# Patient Record
Sex: FEMALE | Race: WHITE | NOT HISPANIC OR LATINO | Employment: FULL TIME | URBAN - METROPOLITAN AREA
[De-identification: names, ages, dates, MRNs, and addresses within clinical notes are randomized per-mention and may not be internally consistent; named-entity substitution may affect disease eponyms.]

---

## 2019-02-15 ENCOUNTER — OFFICE VISIT (OUTPATIENT)
Dept: GASTROENTEROLOGY | Facility: CLINIC | Age: 54
End: 2019-02-15
Payer: COMMERCIAL

## 2019-02-15 DIAGNOSIS — R19.7 DIARRHEA, UNSPECIFIED TYPE: ICD-10-CM

## 2019-02-15 DIAGNOSIS — R14.0 BLOATING: Primary | ICD-10-CM

## 2019-02-15 PROCEDURE — 91065 BREATH HYDROGEN/METHANE TEST: CPT | Performed by: INTERNAL MEDICINE

## 2019-02-15 NOTE — PROGRESS NOTES
[address] [place] [facility]  Bacterial Overgrowth Analytical Record    Donald Melgar 62 y o  female MRN: [id number]  [facility] [place] [address] [facility] [address] [facility]  [address] [facility] [address] [phone]    Date of Test: 2/15/2019    Substrate Given: Lactulose    Ordering Provider: Dr Lillian Chow Assistant: All    Symptoms: Diarrhea and Bloating    Sample Clock Time ppmH2 ppmCH4 Co2% Jerrod   Baseline   8:30am 19 2 3 4 1 61   #1  20 minutes 8:52am 28 2 3 5 1 57   #2  20 minutes 9:13am 23 1 3 8 1 44   #3  20 minutes 9:32am 24 2 3 5 1 57   #4  20 minutes 9:55am 21 1 3 7 1 48   #5  20 minutes 10:15am 20 2 3 5 1 57   #6  20 minutes 10:35am 21 2 3 4 1 61   #7  20 minutes 10:55am 16 2 3 5 1 57   #8  20 minutes 11:13am 26 1 3 8 1 44   #9  20 minutes 11:35am 30 2 3 3 1 66     Interpretation: Per note on Breath test form " High baseline, will treat with Cipro   See epic "

## 2019-02-25 ENCOUNTER — TELEPHONE (OUTPATIENT)
Dept: GASTROENTEROLOGY | Facility: CLINIC | Age: 54
End: 2019-02-25

## 2019-02-25 DIAGNOSIS — K63.89 SMALL INTESTINAL BACTERIAL OVERGROWTH: Primary | ICD-10-CM

## 2019-02-25 NOTE — TELEPHONE ENCOUNTER
Pt left [facility] asking for  [phone]; is asking for results of breath test; still has diarrhea and other issues/asks for next steps?

## 2019-02-26 RX ORDER — CIPROFLOXACIN 500 MG/1
500 TABLET, FILM COATED ORAL EVERY 12 HOURS SCHEDULED
Qty: 14 TABLET | Refills: 0 | Status: SHIPPED | OUTPATIENT
Start: 2019-02-26 | End: 2019-03-05

## 2019-02-26 NOTE — TELEPHONE ENCOUNTER
Left voicemail with results  High baseline but otherwise normal   Will treat with short course of Cipro    If she remains symptomatic I advised colonoscopy to assess for microscopic colitis

## 2019-03-21 ENCOUNTER — OFFICE VISIT (OUTPATIENT)
Dept: GASTROENTEROLOGY | Facility: CLINIC | Age: 54
End: 2019-03-21
Payer: COMMERCIAL

## 2019-03-21 VITALS
SYSTOLIC BLOOD PRESSURE: 138 MMHG | WEIGHT: 150 LBS | HEART RATE: 84 BPM | HEIGHT: 64 IN | DIASTOLIC BLOOD PRESSURE: 82 MMHG | BODY MASS INDEX: 25.61 KG/M2

## 2019-03-21 DIAGNOSIS — R19.7 DIARRHEA, UNSPECIFIED TYPE: Primary | ICD-10-CM

## 2019-03-21 DIAGNOSIS — Z12.11 COLON CANCER SCREENING: ICD-10-CM

## 2019-03-21 DIAGNOSIS — K63.89 SMALL INTESTINAL BACTERIAL OVERGROWTH: ICD-10-CM

## 2019-03-21 PROCEDURE — 99213 OFFICE O/P EST LOW 20 MIN: CPT | Performed by: INTERNAL MEDICINE

## 2019-03-21 RX ORDER — FENOFIBRIC ACID 135 MG/1
CAPSULE, DELAYED RELEASE ORAL
COMMUNITY
Start: 2019-01-22

## 2019-03-21 RX ORDER — ATORVASTATIN CALCIUM 20 MG/1
20 TABLET, FILM COATED ORAL
COMMUNITY
Start: 2018-06-19

## 2019-03-21 RX ORDER — LORATADINE 10 MG/1
10 TABLET ORAL
COMMUNITY

## 2019-03-21 RX ORDER — BUPROPION HYDROCHLORIDE 75 MG/1
75 TABLET ORAL
COMMUNITY
Start: 2018-06-13

## 2019-03-21 RX ORDER — AMOXICILLIN AND CLAVULANATE POTASSIUM 875; 125 MG/1; MG/1
1 TABLET, FILM COATED ORAL 2 TIMES DAILY
Qty: 14 TABLET | Refills: 0 | Status: SHIPPED | OUTPATIENT
Start: 2019-03-21 | End: 2019-03-28

## 2019-03-21 RX ORDER — BLOOD-GLUCOSE SENSOR
EACH MISCELLANEOUS
COMMUNITY
Start: 2019-02-21

## 2019-03-21 NOTE — PROGRESS NOTES
[address] [place] [facility] - Outpatient Follow-up Note  Alicia Burns 62 y o  female MRN: [id number]  Encounter: [id number]    ASSESSMENT AND PLAN:      1  Diarrhea, unspecified type  Started December 5th  Stool studies ordered by her prior gastroenterologist unrevealing  She continues to experience bloating and diarrhea despite reducing gluten and fatty foods  Celiac panel, sed rate and TSH normal   Breath test for bacterial overgrowth showed elevated baseline, treated with Cipro with minimal improvement  Discussed options and we will treat SIBO with an alternate antibiotic (Augmentin) and check additional stool studies  We discussed colonoscopy for microscopic colitis  We discussed nutrition evaluation  2  Colon cancer screening  Negative colonoscopy with another practice in 2013      Followup Appointment[de-identified]  Pending stool studies  ______________________________________________________________________    Chief complaint:  Diarrhea    HPI:  The patient returns for follow-up on diarrhea  She was initially seen January 21st with complaint since December 5th  Stool studies including C diff were negative  She started gluten free diet on her own with some improvement  She typically felt well upon awakening, then developed abdominal distention and bloating in the afternoon with nocturnal diarrhea  Breath test showed a high baseline  She was treated with Cipro with little improvement  She is frustrated by persistent symptoms  She denies any rectal bleeding  Symptoms occur every day regardless of food type      Historical Information   Past Medical History:   Diagnosis Date    Diabetes mellitus ([address] )     Fatty liver     Hyperlipidemia      Past Surgical History:   Procedure Laterality Date    COL[facility]COPY  2013    [facility], negative    FUNDOPLICATION TRANSORAL  7498    [facility] procedure    HYSTERECTOMY  1996    UPPER GASTROINTESTINAL ENDOSCOPY  2017    [facility], with dilatation     Social History     Substance and Sexual Activity   Alcohol Use Not on file     Social History     Substance and Sexual Activity   Drug Use Not on file     Social History     Tobacco Use   Smoking Status Not on file     No family history on file  No current outpatient medications on file  Allergies not on file    10 Point REVIEW OF SYSTEMS IS OTHERWISE NEGATIVE  PHYSICAL EXAM:    There were no vitals taken for this visit  There is no height or weight on file to calculate BMI  General Appearance:  Alert, cooperative, no distress  HEENT:  Normocephalic, atraumatic, anicteric  Neck: Supple, symmetrical, trachea midline  Lungs: Clear to auscultation bilaterally; no rales, rhonchi or wheezing; respirations unlabored   Heart: Regular rate and rhythm; no murmur, rub, or gallop  Abdomen:   Soft, non-tender, non-distended; normal bowel sounds; no masses, no organomegaly   Rectal:  Deferred   Extremities:  No cyanosis, clubbing or edema   Skin:  No jaundice, rashes, or lesions   Lymph nodes: No palpable cervical lymphadenopathy     Lab Results:   No results found for: WBC, HGB, HCT, MCV, PLT  No results found for: NA, K, CL, CO2, ANIONGAP, BUN, CREATININE, GLUCOSE, GLUF, CALCIUM, CORRECTEDCA, AST, ALT, ALKPHOS, PROT, BILITOT, EGFR  No results found for: IRON, TIBC, FERRITIN  No results found for: LIPASE    Radiology Results:   No results found

## 2019-03-21 NOTE — LETTER
March 21, 2019     Annmarie Green MD  [address] [facility]  [address]  [facility] [address]    Patient: Leda Christianson   YOB: 1965   Date of Visit: 3/21/2019       Dear Dr Bruce Yap: Thank you for referring Leda Christianson to me for evaluation  Below are my notes for this consultation  If you have questions, please do not hesitate to call me  I look forward to following your patient along with you  Sincerely,        Millie Redman DO        CC: No Recipients  Millie Redman DO  3/21/2019  4:21 PM  Sign at close encounter  [facility] - Outpatient Follow-up Note  Leda Christianson 62 y o  female MRN: [id number]  Encounter: [id number]    ASSESSMENT AND PLAN:      1  Diarrhea, unspecified type  Started December 5th  Stool studies ordered by her prior gastroenterologist unrevealing  She continues to experience bloating and diarrhea despite reducing gluten and fatty foods  Celiac panel, sed rate and TSH normal   Breath test for bacterial overgrowth showed elevated baseline, treated with Cipro with minimal improvement  Discussed options and we will treat SIBO with an alternate antibiotic (Augmentin) and check additional stool studies  We discussed colonoscopy for microscopic colitis  We discussed nutrition evaluation  2  Colon cancer screening  Negative colonoscopy with another practice in 2013      Followup Appointment[de-identified]  Pending stool studies  ______________________________________________________________________    Chief complaint:  Diarrhea    HPI:  The patient returns for follow-up on diarrhea  She was initially seen January 21st with complaint since December 5th  Stool studies including C diff were negative  She started gluten free diet on her own with some improvement  She typically felt well upon awakening, then developed abdominal distention and bloating in the afternoon with nocturnal diarrhea  Breath test showed a high baseline    She was treated with Cipro with little improvement  She is frustrated by persistent symptoms  She denies any rectal bleeding  Symptoms occur every day regardless of food type  Historical Information   Past Medical History:   Diagnosis Date    Diabetes mellitus ([address] )     Fatty liver     Hyperlipidemia      Past Surgical History:   Procedure Laterality Date    COLON[facility]COPY  2013    [facility], negative    FUNDOPLICATION TRANSORAL  0472    [facility] procedure    HYSTERECTOMY  1996    UPPER GASTROINTESTINAL ENDOSCOPY  2017    [facility], with dilatation     Social History     Substance and Sexual Activity   Alcohol Use Not on file     Social History     Substance and Sexual Activity   Drug Use Not on file     Social History     Tobacco Use   Smoking Status Not on file     No family history on file  No current outpatient medications on file  Allergies not on file    10 Point REVIEW OF SYSTEMS IS OTHERWISE NEGATIVE  PHYSICAL EXAM:    There were no vitals taken for this visit  There is no height or weight on file to calculate BMI  General Appearance:  Alert, cooperative, no distress  HEENT:  Normocephalic, atraumatic, anicteric  Neck: Supple, symmetrical, trachea midline  Lungs: Clear to auscultation bilaterally; no rales, rhonchi or wheezing; respirations unlabored   Heart: Regular rate and rhythm; no murmur, rub, or gallop  Abdomen:   Soft, non-tender, non-distended; normal bowel sounds; no masses, no organomegaly   Rectal:  Deferred   Extremities:  No cyanosis, clubbing or edema   Skin:  No jaundice, rashes, or lesions   Lymph nodes: No palpable cervical lymphadenopathy     Lab Results:   No results found for: WBC, HGB, HCT, MCV, PLT  No results found for: NA, K, CL, CO2, ANIONGAP, BUN, CREATININE, GLUCOSE, GLUF, CALCIUM, CORRECTEDCA, AST, ALT, ALKPHOS, PROT, BILITOT, EGFR  No results found for: IRON, TIBC, FERRITIN  No results found for: LIPASE    Radiology Results:   No results found

## 2019-04-09 ENCOUNTER — TELEPHONE (OUTPATIENT)
Dept: GASTROENTEROLOGY | Facility: CLINIC | Age: 54
End: 2019-04-09

## 2019-04-09 DIAGNOSIS — K63.89 SMALL INTESTINAL BACTERIAL OVERGROWTH: Primary | ICD-10-CM

## 2019-04-09 RX ORDER — METRONIDAZOLE 500 MG/1
500 TABLET ORAL 3 TIMES DAILY
Qty: 30 TABLET | Refills: 0 | Status: SHIPPED | OUTPATIENT
Start: 2019-04-09 | End: 2019-04-19

## 2019-05-07 ENCOUNTER — PATIENT MESSAGE (OUTPATIENT)
Dept: GASTROENTEROLOGY | Facility: CLINIC | Age: 54
End: 2019-05-07

## 2019-05-07 DIAGNOSIS — K63.89 SMALL INTESTINAL BACTERIAL OVERGROWTH: Primary | ICD-10-CM

## 2019-05-08 RX ORDER — METRONIDAZOLE 500 MG/1
500 TABLET ORAL 3 TIMES DAILY
Qty: 42 TABLET | Refills: 0 | Status: SHIPPED | OUTPATIENT
Start: 2019-05-08 | End: 2019-05-22

## 2019-06-21 DIAGNOSIS — R14.0 BLOATING: Primary | ICD-10-CM

## 2019-10-21 ENCOUNTER — OFFICE VISIT (OUTPATIENT)
Dept: GASTROENTEROLOGY | Facility: CLINIC | Age: 54
End: 2019-10-21
Payer: COMMERCIAL

## 2019-10-21 VITALS
WEIGHT: 147 LBS | DIASTOLIC BLOOD PRESSURE: 90 MMHG | SYSTOLIC BLOOD PRESSURE: 130 MMHG | HEART RATE: 71 BPM | BODY MASS INDEX: 25.1 KG/M2 | HEIGHT: 64 IN

## 2019-10-21 DIAGNOSIS — R19.7 DIARRHEA, UNSPECIFIED TYPE: ICD-10-CM

## 2019-10-21 DIAGNOSIS — Z12.11 SCREENING FOR COLON CANCER: ICD-10-CM

## 2019-10-21 DIAGNOSIS — R14.0 BLOATING: Primary | ICD-10-CM

## 2019-10-21 PROCEDURE — 99213 OFFICE O/P EST LOW 20 MIN: CPT | Performed by: INTERNAL MEDICINE

## 2019-10-21 NOTE — PROGRESS NOTES
[facility] - Outpatient Follow-up Note  Edgard Andujar 62 y o  female MRN: [id number]  Encounter: [id number]    ASSESSMENT AND PLAN:      1  Bloating  Started after [place]  Abdominal bloating and distention persists despite treatment of small intestinal bacterial overgrowth  Likely gas bloat after fundoplication, but a small bowel issue like diverticulosis or stricture could lead to recurrent bacterial overgrowth symptoms  Will proceed with upper GI/small bowel series  If negative will consider gastric emptying scan for gastroparesis  She has a call to her surgeon for further advice as well  - [place] upper GI / small bowel with air; Future    2  Diarrhea, unspecified type  Resolved with treatment of bacterial overgrowth    3  Screening for colon cancer  Negative colonoscopy with another practice 2013      Followup Appointment:  Pending imaging______________________________________________________________________    Chief Complaint   Patient presents with   Norakwasi Vazquez     HPI:  The patient returns for follow-up on bloating and abdominal distention  She was last seen in the office in March  Since then she had multiple rounds of antibiotics for bacterial overgrowth, each only provided a few days of relief  She had no change in symptoms with the addition of a probiotic  Last month she had antibiotics for rosacea that also improved her GI complaints  She awakens in the morning with minimal complaints but develops abdominal fullness even with water  Appetite is good  She denies nausea or vomiting  She has belching throughout the day with minimal release of symptoms  She previously had diarrhea but this has resolved  She now has 1 formed, non urgent stool daily  Her Victoza dose was decreased with no change in symptoms  She notices increased bloating with certain types of fruits and vegetables but cannot define and exact trigger      Historical Information Past Medical History:   Diagnosis Date    Depression     Diabetes mellitus ([address] )     Fatty liver     Hyperlipidemia      Past Surgical History:   Procedure Laterality Date    APPENDECTOMY      [facility], [facility]    FUNDOPLICATION TRANSORAL  47/20/6063    [facility] procedure    HYSTERECTOMY  1996    UPPER GASTROINTESTINAL ENDOSCOPY  2017    [facility], with dilatation     Social History     Substance and Sexual Activity   Alcohol Use Yes    Frequency: 2-4 times a month    Drinks per session: 1 or 2     Social History     Substance and Sexual Activity   Drug Use Not on file     Social History     Tobacco Use   Smoking Status Never Smoker   Smokeless Tobacco Never Used     Family History   Problem Relation Age of Onset    Diabetes Father     Cancer Father     Pancreatic cancer Father          Current Outpatient Medications:     atorvastatin (LIPITOR) 20 mg tablet    buPROPion (WELLBUTRIN) 75 mg tablet    Choline Fenofibrate (FENOFIBRIC ACID) 135 MG CPDR    Continuous Blood Gluc Sensor (DEXCOM G6 SENSOR) MISC    HUMALOG 100 UNIT/ML injection    liraglutide (VICTOZA) injection    loratadine (CLARITIN) 10 mg tablet  Allergies   Allergen Reactions    Amoxicillin Hives    Doxycycline Hives    Ibuprofen Hives    Morphine Hives     BP drops    Sodium Metabisulfite Hives    Famotidine Hives and Rash     Reviewed medications and allergies and updated as indicated    PHYSICAL EXAM:    Blood pressure 130/90, pulse 71, height 5' 4" (1 626 m), weight 66 7 kg (147 lb)  Body mass index is 25 23 kg/m²  General Appearance: NAD, cooperative, alert  Eyes: Anicteric, PERRLA, EOMI  ENT:  Normocephalic, atraumatic, normal mucosa  Neck:  Supple, symmetrical, trachea midline  Resp:  Clear to auscultation bilaterally; no rales, rhonchi or wheezing; respirations unlabored   CV:  S1 S2, Regular rate and rhythm; no murmur, rub, or gallop    GI:  Soft, non-tender, minimal distention, mild tympany; normal bowel sounds; no masses, no organomegaly   Rectal: Deferred  Musculoskeletal: No cyanosis, clubbing or edema  Normal ROM  Skin:  No jaundice, rashes, or lesions   Heme/Lymph: No palpable cervical lymphadenopathy  Psych: Normal affect, good eye contact  Neuro: No gross deficits, AAOx3    Lab Results:   No results found for: WBC, HGB, HCT, MCV, PLT  No results found for: NA, K, CL, CO2, ANIONGAP, BUN, CREATININE, GLUCOSE, GLUF, CALCIUM, CORRECTEDCA, AST, ALT, ALKPHOS, PROT, BILITOT, EGFR  No results found for: IRON, TIBC, FERRITIN  No results found for: LIPASE    Radiology Results:   No results found

## 2019-11-05 ENCOUNTER — TELEPHONE (OUTPATIENT)
Dept: GASTROENTEROLOGY | Facility: CLINIC | Age: 54
End: 2019-11-05

## 2019-11-05 DIAGNOSIS — K31.84 GASTROPARESIS: Primary | ICD-10-CM

## 2019-11-05 RX ORDER — METOCLOPRAMIDE 5 MG/1
5 TABLET ORAL 2 TIMES DAILY
Qty: 60 TABLET | Refills: 2 | Status: SHIPPED | OUTPATIENT
Start: 2019-11-05 | End: 2020-01-03 | Stop reason: SDUPTHER

## 2019-11-05 NOTE — TELEPHONE ENCOUNTER
Called patient with results of upper GI  Showed prolonged gastric retention  Suspect this is a consequence of diabetes and difficulty managing her blood sugars over the past several months  Discussed gastroparesis diet  Discussed other options including Reglan and EGD with Botox injection of the pylorus    Will start with a low-dose Reglan twice daily  Side effects discussed    I asked her to contact me with updates via portal   Please arrange office visit in December

## 2020-01-03 ENCOUNTER — OFFICE VISIT (OUTPATIENT)
Dept: GASTROENTEROLOGY | Facility: CLINIC | Age: 55
End: 2020-01-03
Payer: COMMERCIAL

## 2020-01-03 VITALS
BODY MASS INDEX: 25.44 KG/M2 | SYSTOLIC BLOOD PRESSURE: 118 MMHG | WEIGHT: 149 LBS | DIASTOLIC BLOOD PRESSURE: 80 MMHG | HEIGHT: 64 IN | HEART RATE: 78 BPM

## 2020-01-03 DIAGNOSIS — R19.7 DIARRHEA, UNSPECIFIED TYPE: ICD-10-CM

## 2020-01-03 DIAGNOSIS — K31.84 GASTROPARESIS: Primary | ICD-10-CM

## 2020-01-03 DIAGNOSIS — Z12.11 SCREENING FOR COLON CANCER: ICD-10-CM

## 2020-01-03 DIAGNOSIS — K92.89 GAS BLOAT SYNDROME: ICD-10-CM

## 2020-01-03 PROCEDURE — 99214 OFFICE O/P EST MOD 30 MIN: CPT | Performed by: INTERNAL MEDICINE

## 2020-01-03 RX ORDER — METOCLOPRAMIDE 5 MG/1
10 TABLET ORAL 2 TIMES DAILY
Qty: 120 TABLET | Refills: 2 | Status: SHIPPED | OUTPATIENT
Start: 2020-01-03 | End: 2020-03-05 | Stop reason: SINTOL

## 2020-01-03 NOTE — LETTER
January 3, 2020     Sharmila Villalpando MD  [address] [facility] [address]  [facility] [address]    Patient: Williams Walsh   YOB: 1965   Date of Visit: 1/3/2020       Dear Dr Aishwarya Servin: Thank you for referring Williams Walsh to me for evaluation  Below are my notes for this consultation  If you have questions, please do not hesitate to call me  I look forward to following your patient along with you  Sincerely,        Hattie Campos DO        CC: MD Hattie Bobby DO  1/3/2020  5:11 PM  Incomplete  [facility] - Outpatient Follow-up Note  Williams Walsh 62 y o  female MRN: [id number]  Encounter: [id number]    ASSESSMENT AND PLAN:      1  Gastroparesis  2  Gas bloat syndrome  Identified on upper GI in October  Treatment of gastroparesis with metoclopramide and dietary changes is helping her gas bloat symptoms  Will increase metoclopramide as tolerated, initially will try 10 mg twice daily but this could be spread out to 5 mg 4 times daily if needed  We discussed Botox injection of the pylorus if she is intolerant of Reglan  Side effects were discussed  - metoclopramide (REGLAN) 5 mg tablet; Take 2 tablets (10 mg total) by mouth 2 (two) times a day  Dispense: 120 tablet; Refill: 2    3  Diarrhea, unspecified type  Resolved with treatment of bacterial overgrowth  Now feels constipated since starting a lower fiber diet for gastroparesis  Recommended Benefiber    4  Screening for colon cancer  Negative colonoscopy with another practice in 2013    Followup Appointment:  2 months  ______________________________________________________________________    Chief Complaint   Patient presents with    Follow up to upper gi and starting new meds     HPI:  The patient returns for follow-up on bloating and belching  Since her last office visit she had an upper GI that showed gastroparesis  We started Reglan twice daily and initially she had diarrhea    She began to adhere to a gastroparesis diet and started eating less salads  Since then she has had no further diarrhea and actually feels constipated  She takes Senokot if there is no bowel movement 2-3 days  Her gas and bloating have improved but not resolved  She denies vomiting  She has very little appetite during the day and blood sugars have been low  She then gets very hungry for dinner and sugar spike  She often has to sit up at night to allow belching        Historical Information   Past Medical History:   Diagnosis Date    Depression     Diabetes mellitus ([address] )     Fatty liver     Hyperlipidemia      Past Surgical History:   Procedure Laterality Date    APPENDECTOMY      COLON[facility]COPY  2013    [facility], negative    FUNDOPLICATION TRANSORAL  42/77/1654    [facility] procedure    HYSTERECTOMY  1996    UPPER GASTROINTESTINAL ENDOSCOPY  2017    [facility], with dilatation     Social History     Substance and Sexual Activity   Alcohol Use Yes    Frequency: 2-4 times a month    Drinks per session: 1 or 2     Social History     Substance and Sexual Activity   Drug Use Not on file     Social History     Tobacco Use   Smoking Status Never Smoker   Smokeless Tobacco Never Used     Family History   Problem Relation Age of Onset    Diabetes Father     Cancer Father     Pancreatic cancer Father          Current Outpatient Medications:     atorvastatin (LIPITOR) 20 mg tablet    buPROPion (WELLBUTRIN) 75 mg tablet    Choline Fenofibrate (FENOFIBRIC ACID) 135 MG CPDR    Continuous Blood Gluc Sensor (DEXCOM G6 SENSOR) MISC    HUMALOG 100 UNIT/ML injection    liraglutide (VICTOZA) injection    loratadine (CLARITIN) 10 mg tablet    metoclopramide (REGLAN) 5 mg tablet  Allergies   Allergen Reactions    Amoxicillin Hives    Doxycycline Hives    Ibuprofen Hives    Morphine Hives     BP drops    Sodium Metabisulfite Hives    Famotidine Hives and Rash     Reviewed medications and allergies and updated as indicated    PHYSICAL EXAM:    Blood pressure 118/80, pulse 78, height 5' 4" (1 626 m), weight 67 6 kg (149 lb)  Body mass index is 25 58 kg/m²  General Appearance: NAD, cooperative, alert  Eyes: Anicteric, PERRLA, EOMI  ENT:  Normocephalic, atraumatic, normal mucosa  Neck:  Supple, symmetrical, trachea midline  Resp:  Clear to auscultation bilaterally; no rales, rhonchi or wheezing; respirations unlabored   CV:  S1 S2, Regular rate and rhythm; no murmur, rub, or gallop  GI:  Soft, non-tender, mild distention; normal bowel sounds; no masses, no organomegaly   Rectal: Deferred  Musculoskeletal: No cyanosis, clubbing or edema  Normal ROM  Skin:  No jaundice, rashes, or lesions   Heme/Lymph: No palpable cervical lymphadenopathy  Psych: Normal affect, good eye contact  Neuro: No gross deficits, AAOx3    Lab Results:   No results found for: WBC, HGB, HCT, MCV, PLT  No results found for: NA, K, CL, CO2, ANIONGAP, BUN, CREATININE, GLUCOSE, GLUF, CALCIUM, CORRECTEDCA, AST, ALT, ALKPHOS, PROT, BILITOT, EGFR  No results found for: IRON, TIBC, FERRITIN  No results found for: LIPASE    Radiology Results:   No results found

## 2020-01-03 NOTE — PROGRESS NOTES
[address] [facility] - Outpatient Follow-up Note  Patrizia Harris 62 y o  female MRN: [id number]  Encounter: [id number]    ASSESSMENT AND PLAN:      1  Gastroparesis  2  Gas bloat syndrome  Identified on upper GI in October  Treatment of gastroparesis with metoclopramide and dietary changes is helping her gas bloat symptoms  Will increase metoclopramide as tolerated, initially will try 10 mg twice daily but this could be spread out to 5 mg 4 times daily if needed  We discussed Botox injection of the pylorus if she is intolerant of Reglan  Side effects were discussed  - metoclopramide (REGLAN) 5 mg tablet; Take 2 tablets (10 mg total) by mouth 2 (two) times a day  Dispense: 120 tablet; Refill: 2    3  Diarrhea, unspecified type  Resolved with treatment of bacterial overgrowth  Now feels constipated since starting a lower fiber diet for gastroparesis  Recommended Benefiber    4  Screening for colon cancer  Negative colonoscopy with another practice in 2013    Followup Appointment:  2 months  ______________________________________________________________________    Chief Complaint   Patient presents with    Follow up to upper gi and starting new meds     HPI:  The patient returns for follow-up on bloating and belching  Since her last office visit she had an upper GI that showed gastroparesis  We started Reglan twice daily and initially she had diarrhea  She began to adhere to a gastroparesis diet and started eating less salads  Since then she has had no further diarrhea and actually feels constipated  She takes Senokot if there is no bowel movement 2-3 days  Her gas and bloating have improved but not resolved  She denies vomiting  She has very little appetite during the day and blood sugars have been low  She then gets very hungry for dinner and sugar spike  She often has to sit up at night to allow belching        Historical Information   Past Medical History:   Diagnosis Date  Depression     Diabetes mellitus [address] [address]     Fatty liver     Hyperlipidemia      Past Surgical History:   Procedure Laterality Date    APPENDECTOMY      COL[facility]OSCOPY  2013    [facility], negative    FUNDOPLICATION TRANSORAL  31/81/7419    [facility] procedure    HYSTERECTOMY  1996    UPPER GASTROINTESTINAL ENDOSCOPY  2017    [facility], with dilatation     Social History     Substance and Sexual Activity   Alcohol Use Yes    Frequency: 2-4 times a month    Drinks per session: 1 or 2     Social History     Substance and Sexual Activity   Drug Use Not on file     Social History     Tobacco Use   Smoking Status Never Smoker   Smokeless Tobacco Never Used     Family History   Problem Relation Age of Onset    Diabetes Father     Cancer Father     Pancreatic cancer Father          Current Outpatient Medications:     atorvastatin (LIPITOR) 20 mg tablet    buPROPion (WELLBUTRIN) 75 mg tablet    Choline Fenofibrate (FENOFIBRIC ACID) 135 MG CPDR    Continuous Blood Gluc Sensor (DEXCOM G6 SENSOR) MISC    HUMALOG 100 UNIT/ML injection    liraglutide (VICTOZA) injection    loratadine (CLARITIN) 10 mg tablet    metoclopramide (REGLAN) 5 mg tablet  Allergies   Allergen Reactions    Amoxicillin Hives    Doxycycline Hives    Ibuprofen Hives    Morphine Hives     BP drops    Sodium Metabisulfite Hives    Famotidine Hives and Rash     Reviewed medications and allergies and updated as indicated    PHYSICAL EXAM:    Blood pressure 118/80, pulse 78, height 5' 4" (1 626 m), weight 67 6 kg (149 lb)  Body mass index is 25 58 kg/m²  General Appearance: NAD, cooperative, alert  Eyes: Anicteric, PERRLA, EOMI  ENT:  Normocephalic, atraumatic, normal mucosa  Neck:  Supple, symmetrical, trachea midline  Resp:  Clear to auscultation bilaterally; no rales, rhonchi or wheezing; respirations unlabored   CV:  S1 S2, Regular rate and rhythm; no murmur, rub, or gallop    GI:  Soft, non-tender, mild distention; normal bowel sounds; no masses, no organomegaly   Rectal: Deferred  Musculoskeletal: No cyanosis, clubbing or edema  Normal ROM  Skin:  No jaundice, rashes, or lesions   Heme/Lymph: No palpable cervical lymphadenopathy  Psych: Normal affect, good eye contact  Neuro: No gross deficits, AAOx3    Lab Results:   No results found for: WBC, HGB, HCT, MCV, PLT  No results found for: NA, K, CL, CO2, ANIONGAP, BUN, CREATININE, GLUCOSE, GLUF, CALCIUM, CORRECTEDCA, AST, ALT, ALKPHOS, PROT, BILITOT, EGFR  No results found for: IRON, TIBC, FERRITIN  No results found for: LIPASE    Radiology Results:   No results found  emergency department

## 2020-01-03 NOTE — PATIENT INSTRUCTIONS
Increase Reglan to 10 mg twice daily    We have the option to change to 5 mg 4 times a day if needed based on efficacy   add Benefiber once daily in the morning

## 2020-01-08 ENCOUNTER — TELEPHONE (OUTPATIENT)
Dept: GASTROENTEROLOGY | Facility: CLINIC | Age: 55
End: 2020-01-08

## 2020-01-08 NOTE — TELEPHONE ENCOUNTER
Called pt back with Dr Valentin Null recommendations  She is feeling better and has been able to keep soda down  She will call back or proceed to the ER if needed

## 2020-01-08 NOTE — TELEPHONE ENCOUNTER
Pt states she doesn't feel well/asks to [facility]noel w/ Dr Mirta Hendricks  She can't keep anything down/even liquid; has been vomiting all morning/might have gastroparesis/questions blockage; no pain  Advised Dr Ed Easley not here/will have a nurse CB   CB# [phone]

## 2020-01-08 NOTE — TELEPHONE ENCOUNTER
Called pt back, still unable "to keep anything down "  She vomited last at 11:00 and will try again now  Started today with vomiting and she believes is related to her gastroparesis and constipation  Saw Dr Kathryn Baltazar 1/3 and doubled her Reglan dose to 5 mg 2 BID which she started 1/3  Felt well until this am   Denies pain, dizziness or chest pain  Does have bloating and cramping just as she always does  Has been a few days since her last bowel movement  Takes Senokot as needed  Was suggested to try benefiber but wanted to wait to see how the Reglan would work first   Requests advice  Could you please address as Dr Kathryn Baltazar is not available today

## 2020-01-08 NOTE — TELEPHONE ENCOUNTER
And advise small frequent meals, would stick with a liquid diet for the next few days until she is feeling better  Hold off on benefit fiber for now until she is feeling better and sees how the Reglan is working  If feeling constipated can try MiraLax 17 g with 8 oz of fluid daily, or if needed could use rectal suppository or enema  If really unable to keep anything down and feeling dehydrated or lightheaded should go to emergency room

## 2020-01-13 LAB — HBA1C MFR BLD HPLC: 6.7 %

## 2020-03-05 ENCOUNTER — OFFICE VISIT (OUTPATIENT)
Dept: GASTROENTEROLOGY | Facility: CLINIC | Age: 55
End: 2020-03-05
Payer: COMMERCIAL

## 2020-03-05 VITALS
SYSTOLIC BLOOD PRESSURE: 120 MMHG | HEART RATE: 80 BPM | BODY MASS INDEX: 24.41 KG/M2 | WEIGHT: 143 LBS | DIASTOLIC BLOOD PRESSURE: 82 MMHG | HEIGHT: 64 IN

## 2020-03-05 DIAGNOSIS — R11.0 NAUSEA: Primary | ICD-10-CM

## 2020-03-05 DIAGNOSIS — K92.89 GAS BLOAT SYNDROME: ICD-10-CM

## 2020-03-05 DIAGNOSIS — K31.84 GASTROPARESIS: ICD-10-CM

## 2020-03-05 DIAGNOSIS — R19.4 CHANGE IN BOWEL HABITS: ICD-10-CM

## 2020-03-05 DIAGNOSIS — Z12.11 SCREENING FOR COLON CANCER: ICD-10-CM

## 2020-03-05 PROCEDURE — 99214 OFFICE O/P EST MOD 30 MIN: CPT | Performed by: INTERNAL MEDICINE

## 2020-03-05 RX ORDER — ONDANSETRON 4 MG/1
4 TABLET, ORALLY DISINTEGRATING ORAL EVERY 6 HOURS PRN
Qty: 20 TABLET | Refills: 0 | Status: SHIPPED | OUTPATIENT
Start: 2020-03-05

## 2020-03-05 NOTE — PROGRESS NOTES
[email or web address] - Outpatient Follow-up Note  Sandra Sofia 60 y o  female MRN: [id number]  Encounter: [id number]    ASSESSMENT AND PLAN:      1  Nausea  2  Gastroparesis  Gastroparesis identified on upper GI October 2019  Did not tolerate Reglan due to side effects of fatigue and depression  Some improvement with gastroparesis diet but she has nausea/vomiting if she over eats  Discussed other options like domperidone or EGD with Botox injection of the pylorus but she is not ready for this yet  Okay to use Zofran as needed   - ondansetron (ZOFRAN-ODT) 4 mg disintegrating tablet; Take 1 tablet (4 mg total) by mouth every 6 (six) hours as needed for nausea or vomiting  Dispense: 20 tablet; Refill: 0    3  Gas bloat syndrome  Multifactorial, gastroparesis and lynx fundoplication  Improving with gastroparesis diet, particularly limiting dietary fiber    4  Change in bowel habits  Prior baseline was diarrhea, now constipated  Suspect this is related to reducing dietary fiber  Recommended increasing her daily Benefiber    5  Screening for colon cancer  Negative colonoscopy with another practice in 2013      Followup Appointment:  3-4 months  ______________________________________________________________________    Chief Complaint   Patient presents with    Follow up-gastroparesis     HPI:  The patient returns for follow-up on gastroparesis and bloating  She was last seen in the office in January  At that time she had some improvement of gastroparesis with medical provided and a gastroparesis diet  She gradually developed more Reglan side effects like fatigue and depression so she stopped it  She has focused morning gastroparesis diet  Her bloating has improved significantly, particularly with reducing fiber  She still experiences intermittent nausea and has vomiting if she eats too much  Lately she has experience constipation  Prior baseline was diarrhea    She denies any rectal bleeding  Her diabetic meds were recently adjusted  Historical Information   Past Medical History:   Diagnosis Date    Depression     Diabetes mellitus ([address] )     Fatty liver     Hyperlipidemia      Past Surgical History:   Procedure Laterality Date    APPENDECTOMY      COLONOSCOPY  2013    [facility], negative    FUNDOPLICATION TRANSORAL  41/[id number]/8230    Lynx procedure    HYSTERECTOMY  1996    UPPER GASTROINTESTINAL ENDOSCOPY  2017    [facility], with dilatation     Social History     Substance and Sexual Activity   Alcohol Use Yes    Frequency: 2-4 times a month    Drinks per session: 1 or 2     Social History     Substance and Sexual Activity   Drug Use Not on file     Social History     Tobacco Use   Smoking Status Never Smoker   Smokeless Tobacco Never Used     Family History   Problem Relation Age of Onset    Diabetes Father     Cancer Father     Pancreatic cancer Father     Colon cancer Neg Hx     Colon polyps Neg Hx          Current Outpatient Medications:     atorvastatin (LIPITOR) 20 mg tablet    buPROPion (WELLBUTRIN) 75 mg tablet    Choline Fenofibrate (FENOFIBRIC ACID) 135 MG CPDR    Continuous Blood Gluc Sensor (DEXCOM G6 SENSOR) MISC    HUMALOG 100 UNIT/ML injection    liraglutide (VICTOZA) injection    loratadine (CLARITIN) 10 mg tablet    ondansetron (ZOFRAN-ODT) 4 mg disintegrating tablet  Allergies   Allergen Reactions    Amoxicillin Hives    Doxycycline Hives    Ibuprofen Hives    Morphine Hives     BP drops    Sodium Metabisulfite Hives    Famotidine Hives and Rash     Reviewed medications and allergies and updated as indicated    PHYSICAL EXAM:    Blood pressure 120/82, pulse 80, height 5' 4" (1 626 m), weight 64 9 kg (143 lb)  Body mass index is 24 55 kg/m²  General Appearance: NAD, cooperative, alert  Eyes: Anicteric, PERRLA, EOMI  ENT:  Normocephalic, atraumatic, normal mucosa      Neck:  Supple, symmetrical, trachea midline  Resp: Clear to auscultation bilaterally; no rales, rhonchi or wheezing; respirations unlabored   CV:  S1 S2, Regular rate and rhythm; no murmur, rub, or gallop  GI:  Soft, non-tender, non-distended; normal bowel sounds; no masses, no organomegaly   Rectal: Deferred  Musculoskeletal: No cyanosis, clubbing or edema  Normal ROM  Skin:  No jaundice, rashes, or lesions   Heme/Lymph: No palpable cervical lymphadenopathy  Psych: Normal affect, good eye contact  Neuro: No gross deficits, AAOx3    Lab Results:   No results found for: WBC, HGB, HCT, MCV, PLT  No results found for: NA, K, CL, CO2, ANIONGAP, BUN, CREATININE, GLUCOSE, GLUF, CALCIUM, CORRECTEDCA, AST, ALT, ALKPHOS, PROT, BILITOT, EGFR  No results found for: IRON, TIBC, FERRITIN  No results found for: LIPASE    Radiology Results:   No results found

## 2020-08-19 ENCOUNTER — TELEPHONE (OUTPATIENT)
Dept: GASTROENTEROLOGY | Facility: CLINIC | Age: 55
End: 2020-08-19

## 2020-08-19 NOTE — TELEPHONE ENCOUNTER
"Opaque density" is lynx device for reflux  Gastric distention is from gastroparesis and gas bloat, exacerbated by anti-reflux procedure; similar findings seen on prior upper GI    If she has ongoing GI symptoms we can arrange office or virtual visit, otherwise she can contact us as needed

## 2020-08-19 NOTE — TELEPHONE ENCOUNTER
Dr Maegan Barry - please see x-ray from [address] from  8/19/2020   (care everywhere)  Shows gastric distention and opaque density at esophagogastric junction

## 2020-08-19 NOTE — TELEPHONE ENCOUNTER
Patient called  States had x-ray yesterday ordered by urology and they told her to call GI about something on the results  She is going to have them fax x-ray to our back line as it was done at [facility]    Her call back # is

## 2021-03-10 DIAGNOSIS — Z23 ENCOUNTER FOR IMMUNIZATION: ICD-10-CM

## 2021-03-17 ENCOUNTER — IMMUNIZATIONS (OUTPATIENT)
Dept: FAMILY MEDICINE CLINIC | Facility: HOSPITAL | Age: 56
End: 2021-03-17

## 2021-03-17 DIAGNOSIS — Z23 ENCOUNTER FOR IMMUNIZATION: Primary | ICD-10-CM

## 2021-03-17 PROCEDURE — 91300 SARS-COV-2 / COVID-19 MRNA VACCINE (PFIZER-BIONTECH) 30 MCG: CPT

## 2021-03-17 PROCEDURE — 0001A SARS-COV-2 / COVID-19 MRNA VACCINE (PFIZER-BIONTECH) 30 MCG: CPT

## 2021-04-09 ENCOUNTER — IMMUNIZATIONS (OUTPATIENT)
Dept: FAMILY MEDICINE CLINIC | Facility: HOSPITAL | Age: 56
End: 2021-04-09

## 2021-04-09 DIAGNOSIS — Z23 ENCOUNTER FOR IMMUNIZATION: Primary | ICD-10-CM

## 2021-04-09 PROCEDURE — 0002A SARS-COV-2 / COVID-19 MRNA VACCINE (PFIZER-BIONTECH) 30 MCG: CPT

## 2021-04-09 PROCEDURE — 91300 SARS-COV-2 / COVID-19 MRNA VACCINE (PFIZER-BIONTECH) 30 MCG: CPT
